# Patient Record
Sex: MALE | URBAN - METROPOLITAN AREA
[De-identification: names, ages, dates, MRNs, and addresses within clinical notes are randomized per-mention and may not be internally consistent; named-entity substitution may affect disease eponyms.]

---

## 2017-01-09 ENCOUNTER — EMERGENCY (EMERGENCY)
Facility: HOSPITAL | Age: 32
LOS: 1 days | Discharge: HOME | End: 2017-01-09
Attending: EMERGENCY MEDICINE | Admitting: EMERGENCY MEDICINE
Payer: MEDICARE

## 2017-01-09 VITALS
TEMPERATURE: 98 F | DIASTOLIC BLOOD PRESSURE: 63 MMHG | OXYGEN SATURATION: 99 % | HEART RATE: 60 BPM | RESPIRATION RATE: 16 BRPM | SYSTOLIC BLOOD PRESSURE: 137 MMHG

## 2017-01-09 VITALS
SYSTOLIC BLOOD PRESSURE: 125 MMHG | DIASTOLIC BLOOD PRESSURE: 57 MMHG | RESPIRATION RATE: 18 BRPM | OXYGEN SATURATION: 98 % | TEMPERATURE: 98 F | HEART RATE: 63 BPM

## 2017-01-09 DIAGNOSIS — F20.3 UNDIFFERENTIATED SCHIZOPHRENIA: ICD-10-CM

## 2017-01-09 PROCEDURE — 90792 PSYCH DIAG EVAL W/MED SRVCS: CPT

## 2017-01-09 PROCEDURE — 99053 MED SERV 10PM-8AM 24 HR FAC: CPT

## 2017-01-09 PROCEDURE — 99284 EMERGENCY DEPT VISIT MOD MDM: CPT | Mod: 25

## 2017-01-09 NOTE — ED BEHAVIORAL HEALTH NOTE - BEHAVIORAL HEALTH NOTE
Rebecca Jorge Luis [phone]. pt is staying with mother's sister. pt has been in the [facility] 2-3 weeks ago [facility] when decompensated that pt is paranoid, agitated and confused. denies drug use. denies history of suicide. last spoke with the pt 2-3 hours ago. last spoke with him 2 days ago, pt is looking for work and housing. pt to followup with [facility] referral. Zyprexa switched from risperidone. history of more than 40 hospitalization.    Ashley Jackson [phone]. she reports that the pt wears his  uniform, smoking MJ. mood swings. reports grandiosity wanting to join the ISH. She reports that he becomes upset making grandiose statement about the ISH when she tries to place limits at home. She denies acute safety concerns. She reports that on Friday she sent him out of the house due to poor behaviors, smoking MJ and he was supposed to go to the [facility]. .

## 2017-01-09 NOTE — ED BEHAVIORAL HEALTH NOTE - BEHAVIORAL HEALTH NOTE
Writer was informed during morning hand off pt was medically and psychiatrically cleared, just waiting for metrocards and shelter list.  [facility] provided nursing with two [facility] serial # [id number]  [phone] and list of shelters.

## 2017-01-09 NOTE — ED PROVIDER NOTE - MEDICAL DECISION MAKING DETAILS
31y M without PMH p/w exacerbation of schizophrenia, kicked out of home, not taking medication x 4 days
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
## 2017-01-09 NOTE — ED ADULT TRIAGE NOTE - CHIEF COMPLAINT QUOTE
Pt c/o feeling depressed.  Hx of schizophrenia and has not taken meds x4 days. Denies SI, HI, hallucinations, drug or alcohol use. Calm and cooperative in triage. Pt brought to .
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
## 2017-01-09 NOTE — ED BEHAVIORAL HEALTH ASSESSMENT NOTE - DETAILS
[facility] unable to obtain records through the ED risperidone gynecomastia paternal schizophrenia mother informed

## 2017-01-09 NOTE — ED BEHAVIORAL HEALTH ASSESSMENT NOTE - DESCRIPTION
calm and cooperative scot currently homeless. reports support of mother and aunt. reports plan to become a [employer]  and intends to return to school